# Patient Record
Sex: FEMALE | Race: WHITE | NOT HISPANIC OR LATINO | Employment: FULL TIME | URBAN - METROPOLITAN AREA
[De-identification: names, ages, dates, MRNs, and addresses within clinical notes are randomized per-mention and may not be internally consistent; named-entity substitution may affect disease eponyms.]

---

## 2017-07-22 ENCOUNTER — OFFICE VISIT (OUTPATIENT)
Dept: URGENT CARE | Facility: CLINIC | Age: 22
End: 2017-07-22
Payer: COMMERCIAL

## 2017-07-22 VITALS
OXYGEN SATURATION: 99 % | RESPIRATION RATE: 16 BRPM | BODY MASS INDEX: 21.69 KG/M2 | WEIGHT: 135 LBS | HEIGHT: 66 IN | HEART RATE: 76 BPM | DIASTOLIC BLOOD PRESSURE: 73 MMHG | SYSTOLIC BLOOD PRESSURE: 118 MMHG | TEMPERATURE: 98 F

## 2017-07-22 DIAGNOSIS — H66.92 LEFT OTITIS MEDIA, UNSPECIFIED CHRONICITY, UNSPECIFIED OTITIS MEDIA TYPE: Primary | ICD-10-CM

## 2017-07-22 DIAGNOSIS — H60.339 ACUTE SWIMMER'S EAR, UNSPECIFIED LATERALITY: ICD-10-CM

## 2017-07-22 PROCEDURE — 99213 OFFICE O/P EST LOW 20 MIN: CPT | Mod: 25,S$GLB,, | Performed by: INTERNAL MEDICINE

## 2017-07-22 PROCEDURE — 96372 THER/PROPH/DIAG INJ SC/IM: CPT | Mod: S$GLB,,, | Performed by: INTERNAL MEDICINE

## 2017-07-22 RX ORDER — NORETHINDRONE ACETATE AND ETHINYL ESTRADIOL .02; 1 MG/1; MG/1
1 TABLET ORAL DAILY
COMMUNITY
End: 2021-08-18

## 2017-07-22 RX ORDER — BETAMETHASONE SODIUM PHOSPHATE AND BETAMETHASONE ACETATE 3; 3 MG/ML; MG/ML
9 INJECTION, SUSPENSION INTRA-ARTICULAR; INTRALESIONAL; INTRAMUSCULAR; SOFT TISSUE ONCE
Status: COMPLETED | OUTPATIENT
Start: 2017-07-22 | End: 2017-07-22

## 2017-07-22 RX ORDER — AMOXICILLIN AND CLAVULANATE POTASSIUM 875; 125 MG/1; MG/1
1 TABLET, FILM COATED ORAL EVERY 12 HOURS
Qty: 20 TABLET | Refills: 0 | Status: SHIPPED | OUTPATIENT
Start: 2017-07-22 | End: 2017-08-01

## 2017-07-22 RX ORDER — NEOMYCIN SULFATE, POLYMYXIN B SULFATE, HYDROCORTISONE 3.5; 10000; 1 MG/ML; [USP'U]/ML; MG/ML
4 SOLUTION/ DROPS AURICULAR (OTIC) 3 TIMES DAILY
Qty: 10 ML | Refills: 0 | Status: SHIPPED | OUTPATIENT
Start: 2017-07-22 | End: 2017-08-01

## 2017-07-22 RX ADMIN — BETAMETHASONE SODIUM PHOSPHATE AND BETAMETHASONE ACETATE 9 MG: 3; 3 INJECTION, SUSPENSION INTRA-ARTICULAR; INTRALESIONAL; INTRAMUSCULAR; SOFT TISSUE at 04:07

## 2017-07-22 NOTE — PROGRESS NOTES
"Subjective:       Patient ID: Kyler Ng is a 21 y.o. female.    Vitals:  height is 5' 6" (1.676 m) and weight is 61.2 kg (135 lb). Her tympanic temperature is 98.3 °F (36.8 °C). Her blood pressure is 118/73 and her pulse is 76. Her respiration is 16 and oxygen saturation is 99%.     Chief Complaint: Otalgia    Pt recently started scuba lessons on Friday and thinks that may have started the problem off. Pt also reprorts some neck pain      Otalgia    There is pain in the left ear. This is a new problem. The current episode started yesterday. The problem occurs constantly. The problem has been unchanged. There has been no fever. The pain is at a severity of 10/10. The pain is severe. She has tried acetaminophen for the symptoms. The treatment provided no relief. Her past medical history is significant for a chronic ear infection and a tympanostomy tube.     Review of Systems   Constitution: Negative for chills, fever and malaise/fatigue.   HENT: Positive for ear pain.    Eyes: Negative for discharge and redness.   Cardiovascular: Negative for chest pain, dyspnea on exertion and leg swelling.   Respiratory: Negative for sputum production and wheezing.    Musculoskeletal: Negative for myalgias.   Gastrointestinal: Negative for nausea.       Objective:      Physical Exam   Constitutional: She appears well-developed and well-nourished.   HENT:   Right Ear: There is swelling (,erythema and exudate L canal). Tympanic membrane is injected and erythematous (,with fluid behind drum).   Eyes: Conjunctivae and EOM are normal. Pupils are equal, round, and reactive to light.   Cardiovascular: Normal rate, regular rhythm, normal heart sounds and intact distal pulses.    Pulmonary/Chest: Effort normal and breath sounds normal.   Skin: Skin is warm and dry.       Assessment:       1. Left otitis media, unspecified chronicity, unspecified otitis media type    2. Acute swimmer's ear, unspecified laterality        Plan:         Left " otitis media, unspecified chronicity, unspecified otitis media type  -     betamethasone acetate-betamethasone sodium phosphate injection 9 mg; Inject 1.5 mLs (9 mg total) into the muscle once.  -     amoxicillin-clavulanate 875-125mg (AUGMENTIN) 875-125 mg per tablet; Take 1 tablet by mouth every 12 (twelve) hours.  Dispense: 20 tablet; Refill: 0    Acute swimmer's ear, unspecified laterality  -     neomycin-polymyxin-hydrocortisone (CORTISPORIN) otic solution; Place 4 drops into the left ear 3 (three) times daily.  Dispense: 10 mL; Refill: 0

## 2018-11-08 ENCOUNTER — OFFICE VISIT (OUTPATIENT)
Dept: URGENT CARE | Facility: CLINIC | Age: 23
End: 2018-11-08
Payer: COMMERCIAL

## 2018-11-08 VITALS
OXYGEN SATURATION: 98 % | SYSTOLIC BLOOD PRESSURE: 120 MMHG | TEMPERATURE: 98 F | WEIGHT: 135 LBS | HEART RATE: 85 BPM | DIASTOLIC BLOOD PRESSURE: 81 MMHG | HEIGHT: 66 IN | RESPIRATION RATE: 16 BRPM | BODY MASS INDEX: 21.69 KG/M2

## 2018-11-08 DIAGNOSIS — R30.0 DYSURIA: Primary | ICD-10-CM

## 2018-11-08 DIAGNOSIS — B00.9 HERPES: ICD-10-CM

## 2018-11-08 LAB
B-HCG UR QL: NEGATIVE
BILIRUB UR QL STRIP: NEGATIVE
CTP QC/QA: YES
GLUCOSE UR QL STRIP: NEGATIVE
KETONES UR QL STRIP: NEGATIVE
LEUKOCYTE ESTERASE UR QL STRIP: NEGATIVE
PH, POC UA: 6 (ref 5–8)
POC BLOOD, URINE: NEGATIVE
POC NITRATES, URINE: NEGATIVE
PROT UR QL STRIP: NEGATIVE
SP GR UR STRIP: 1.01 (ref 1–1.03)
UROBILINOGEN UR STRIP-ACNC: NORMAL (ref 0.1–1.1)

## 2018-11-08 PROCEDURE — 81025 URINE PREGNANCY TEST: CPT | Mod: S$GLB,,, | Performed by: NURSE PRACTITIONER

## 2018-11-08 PROCEDURE — 87086 URINE CULTURE/COLONY COUNT: CPT

## 2018-11-08 PROCEDURE — 99214 OFFICE O/P EST MOD 30 MIN: CPT | Mod: 25,S$GLB,, | Performed by: NURSE PRACTITIONER

## 2018-11-08 PROCEDURE — 81003 URINALYSIS AUTO W/O SCOPE: CPT | Mod: QW,S$GLB,, | Performed by: NURSE PRACTITIONER

## 2018-11-08 PROCEDURE — 3008F BODY MASS INDEX DOCD: CPT | Mod: CPTII,S$GLB,, | Performed by: NURSE PRACTITIONER

## 2018-11-08 PROCEDURE — 87660 TRICHOMONAS VAGIN DIR PROBE: CPT

## 2018-11-08 RX ORDER — VALACYCLOVIR HYDROCHLORIDE 500 MG/1
500 TABLET, FILM COATED ORAL 2 TIMES DAILY
Qty: 6 TABLET | Refills: 1 | Status: ON HOLD | OUTPATIENT
Start: 2018-11-08 | End: 2022-07-22 | Stop reason: HOSPADM

## 2018-11-08 NOTE — PATIENT INSTRUCTIONS
We will call with urine culture and swab results  Valtrex if needed for outbreaks as discussed     UTI Relief   · Increase water intake, decrease sodas, tea, coffee, energy drinks. Cranberry products are thought to protect against UTI by inhibiting bacterial growth and adhesion to the bladder.   · Tylenol (acetaminophen) and/or NSAIDS (motrin, ibuprofen)   · Timed voiding every 2-3 hours ( void every 2-3 hours even if you do not feel the urge)  · OTC AZO/pyridium if symptoms are persistent - this will turn your urine orange.  · Sitting in the tub of warm water 20-30 minutes 3-4x/day can help with symptom relief   · Avoid tight fitting cloths, douching, tampon use  · Wipe front to back   · Void prior to and after intercourse   · Use probiotics especially with any antibiotic therapy

## 2018-11-08 NOTE — PROGRESS NOTES
"Subjective:       Patient ID: Kyler Ng is a 23 y.o. female.    Vitals:  height is 5' 6" (1.676 m) and weight is 61.2 kg (135 lb). Her temperature is 98.4 °F (36.9 °C). Her blood pressure is 120/81 and her pulse is 85. Her respiration is 16 and oxygen saturation is 98%.     Chief Complaint: Dysuria      Cloudy urine this morning, frequency for 2 days, Denies dysuria. States it just "feels different" down there  Denies discharge or itching.   2 months ago had herpes outbreak   Drink coffee 1 cup a day, water the rest  States she was checked recently and does not believe she has std         Dysuria    This is a new problem. The current episode started yesterday. The problem has been gradually worsening. There has been no fever. She is sexually active. There is no history of pyelonephritis. Associated symptoms include frequency and urgency. Pertinent negatives include no chills, flank pain, hematuria, nausea, vomiting or rash. She has tried nothing for the symptoms.     Review of Systems   Constitution: Negative for chills and fever.   Skin: Negative for itching and rash.   Musculoskeletal: Negative for back pain.   Gastrointestinal: Negative for abdominal pain, nausea and vomiting.   Genitourinary: Positive for dysuria, frequency and urgency. Negative for flank pain, genital sores, hematuria, missed menses and non-menstrual bleeding.       Objective:      Physical Exam   Constitutional: She is oriented to person, place, and time. She appears well-developed and well-nourished.   HENT:   Head: Normocephalic and atraumatic.   Right Ear: External ear normal.   Left Ear: External ear normal.   Nose: Nose normal.   Eyes: Lids are normal.   Neck: Trachea normal, normal range of motion and phonation normal. Neck supple.   Cardiovascular: Normal pulses.   Pulmonary/Chest: Effort normal.   Abdominal: Soft. Normal appearance and bowel sounds are normal. She exhibits no distension. There is no tenderness. There is no CVA " tenderness.   Neurological: She is alert and oriented to person, place, and time.   Skin: Skin is warm, dry and intact.   Psychiatric: She has a normal mood and affect. Her speech is normal and behavior is normal. Cognition and memory are normal.   Nursing note and vitals reviewed.      Assessment:       1. Dysuria    2. Herpes        Plan:         Dysuria  -     POCT Urinalysis, Dipstick, Automated, W/O Scope  -     POCT urine pregnancy  -     Urine culture  -     Vaginosis Screen by DNA Probe    Herpes    Other orders  -     valACYclovir (VALTREX) 500 MG tablet; Take 1 tablet (500 mg total) by mouth 2 (two) times daily. for 3 days  Dispense: 6 tablet; Refill: 1      Patient Instructions   We will call with urine culture and swab results  Valtrex if needed for outbreaks as discussed     UTI Relief   · Increase water intake, decrease sodas, tea, coffee, energy drinks. Cranberry products are thought to protect against UTI by inhibiting bacterial growth and adhesion to the bladder.   · Tylenol (acetaminophen) and/or NSAIDS (motrin, ibuprofen)   · Timed voiding every 2-3 hours ( void every 2-3 hours even if you do not feel the urge)  · OTC AZO/pyridium if symptoms are persistent - this will turn your urine orange.  · Sitting in the tub of warm water 20-30 minutes 3-4x/day can help with symptom relief   · Avoid tight fitting cloths, douching, tampon use  · Wipe front to back   · Void prior to and after intercourse   · Use probiotics especially with any antibiotic therapy

## 2018-11-09 ENCOUNTER — TELEPHONE (OUTPATIENT)
Dept: URGENT CARE | Facility: CLINIC | Age: 23
End: 2018-11-09

## 2018-11-09 LAB
CANDIDA RRNA VAG QL PROBE: NEGATIVE
G VAGINALIS RRNA GENITAL QL PROBE: POSITIVE
T VAGINALIS RRNA GENITAL QL PROBE: NEGATIVE

## 2018-11-09 RX ORDER — METRONIDAZOLE 500 MG/1
500 TABLET ORAL EVERY 12 HOURS
Qty: 14 TABLET | Refills: 0 | Status: SHIPPED | OUTPATIENT
Start: 2018-11-09 | End: 2018-11-16

## 2018-11-09 NOTE — TELEPHONE ENCOUNTER
Called to tell patient BV positive and flagyl sen into pharmacy. Left message for patient to call back will recommend to add probiotics

## 2018-11-10 LAB — BACTERIA UR CULT: NORMAL

## 2018-11-11 ENCOUNTER — TELEPHONE (OUTPATIENT)
Dept: URGENT CARE | Facility: CLINIC | Age: 23
End: 2018-11-11

## 2018-11-11 NOTE — TELEPHONE ENCOUNTER
Urine culture negative. Vaginosis screen was positive for BV and patient was started on flagyl appropriately. Left v/m for patient to call back to review results.

## 2018-11-11 NOTE — TELEPHONE ENCOUNTER
Reviewed results of vaginosis screen with patient. She states she is feeling better on flagyl. advised to continue antibiotic until completion.

## 2020-02-05 NOTE — PROGRESS NOTES
New Patient     SUBJECTIVE:  Patient ID: Kyler Ng   MRN: [id number]  Referred By: Dr. Olinda Wells  Chief Complaint: Headache      History of Present Illness:   24 y.o. female with a PMHx of OCD, depression, endometriosis, who presents to clinic with boyfriend for evaluation of headaches.   PMHx negative for TBI, Meningitis, Aneurysms, Kidney Stones, asthma, GI bleed, osteoporosis, CAD/MI, CVA/TIA, DM, cancer, pregnancy   Family Hx negative for Migraines.     Headache History:  Onset - 6 mo  Previous Hx of HA - yes, triggered by lack of sleep/caffeine, relieved by sleep and ibuprofen  Location/Radiation - unilateral temples, behind ears  Quality - pressure  Duration - 3 hours - 8 hours  Intensity (range) - 2-10/10  Frequency - 15/30 ha days per month x 3 mo, 2/30 are debilitating  Triggers - alcohol, weather changes  Aggravating Factors - sunlight, loud noises, smells  Alleviating Factors - ice pack, dark room, sleep  Recent Changes - switched birth control to a higher dose of estrogen (however HA's have never been related to her menstrual cycles)  Prodrome/Aura - prior to HA experiences nausea, lightheaded, dizziness  HA today? - 1/10  Time of day of most headaches- lunch time (drinks coffee daily, skips breakfast)  Sleep - snores, doesn't always wake feeling refreshed, resolution of headache with sleep, doesn't wake w/ HA, sleeps well, wakes through night    Associated symptoms with the headache: nausea, lightheaded, dizziness, photo/phonophobia, hyperosmia    Treatments Tried   Ibuprofen, excedrin, tylenol - 3d/week    Social History  Alcohol - socially  Smoke - denies  Recreational Drug Use- denies    Current Medications:    Current Outpatient Medications:     norethindrone-ethinyl estradiol (MICROGESTIN 1/20) 1-20 mg-mcg per tablet, Take 1 tablet by mouth once daily., Disp: , Rfl:     amitriptyline (ELAVIL) 25 MG tablet, Take 1 tablet (25 mg total) by mouth every evening., Disp: 30 tablet, Rfl: 3    naproxen  "(NAPROSYN) 250 MG tablet, Take 1 tablet (250 mg total) by mouth 2 (two) times daily as needed (for headache)., Disp: 20 tablet, Rfl: 3    ondansetron (ZOFRAN-ODT) 4 MG TbDL, Take 1 tablet (4 mg total) by mouth every 6 (six) hours as needed., Disp: 20 tablet, Rfl: 3    rizatriptan (MAXALT-MLT) 5 MG disintegrating tablet, Take at onset of migraine, can repeat in 2 hrs if needed.  No more than 2 tabs per day or 3 days/wk., Disp: 12 tablet, Rfl: 3    valACYclovir (VALTREX) 500 MG tablet, Take 1 tablet (500 mg total) by mouth 2 (two) times daily. for 3 days, Disp: 6 tablet, Rfl: 1    Review of Systems - as per HPI, otherwise a balanced 10 systems review is negative.    OBJECTIVE:  Vitals:  /71   Pulse 91   Ht 5' 6" (1.676 m)   Wt 69.4 kg (153 lb)   BMI 24.69 kg/m²     Physical Exam   Constitutional: she appears well-developed and well-nourished. she is well groomed. NAD  HENT:    Head: Normocephalic and atraumatic, oral and nasal mucosa intact.  Frontalis was TTP, temporalis was TTP   Eyes: Conjunctivae and EOM are normal. Pupils are equal, round, and reactive to light   Neck: Neck supple. Occiput and trapezius TTP, traps, + tinnel   Musculoskeletal: Normal range of motion. No joint stiffness. No vertebral point tenderness.  Skin: Skin is warm and dry.  Psychiatric: Normal mood and affect.     Neuro exam:    Mental status:  The patient is alert and oriented to person, place and time.  Language is intact and fluent  Remote and recent memory are intact  Normal attention and concentration  Mood is stable    Cranial Nerves:  Fundoscopic examination does not reveal any occult papilledema.    Pupils are equal and reactive to light.    Extraocular movements are intact and without nystagmus.    Visual fields are full to confrontation testing.   Facial movement is symmetric.  Facial sensation is intact.    Hearing is intact to finger rub   Uvula in midline. Tongue in midline without fasciculation.   FROM of neck in " all (6) directions without pain  Shoulder shrug symmetrical.    Coordination:     Finger to nose - normal and symmetric bilaterally   Heel to shin test - normal and symmetric bilaterally     Motor:  Normal muscle bulk and symmetry. No fasciculations were noted.   Tremor not apparent   Pronator drift not apparent.    strength was strong and symmetric  Finger extension strength was strong and symmetric  RUE:appropriate against gravity and medium force as tested 5/5  LUE: appropriate against gravity and medium force as tested 5/5  RLE:appropriate against gravity and medium force as tested 5/5              LLE: appropriate against gravity and medium force as tested 5/5    Reflexes:  Right Brachioradialis 2+  Left Brachioradialis 2+  Right Biceps 2+  Left Biceps 2+  Right Patellar2+  Left Patellar 2+  Right Achilles 2+  Left Achilles 2+                          Godoy was negative    Sensory:  RUE  intact light touch  LUE intact light touch  RLE intact light touch  LLE intact light touch    Gait:   Romberg - negative  Normal gait  Tandem, Heel, and Toe Walk - able to perform without difficulty    Review of Data:   Notes from urgent care reviewed   Labs:  No visits with results within 3 Month(s) from this visit.   Latest known visit with results is:   Office Visit on 11/08/2018   Component Date Value Ref Range Status    POC Blood, Urine 11/08/2018 Negative  Negative Final    POC Bilirubin, Urine 11/08/2018 Negative  Negative Final    POC Urobilinogen, Urine 11/08/2018 Normal  0.1 - 1.1 Final    POC Ketones, Urine 11/08/2018 Negative  Negative Final    POC Protein, Urine 11/08/2018 Negative  Negative Final    POC Nitrates, Urine 11/08/2018 Negative  Negative Final    POC Glucose, Urine 11/08/2018 Negative  Negative Final    pH, UA 11/08/2018 6.0  5 - 8 Final    POC Specific Gravity, Urine 11/08/2018 1.015  1.003 - 1.029 Final    POC Leukocytes, Urine 11/08/2018 Negative  Negative Final    POC Preg Test, Ur  11/08/2018 Negative  Negative Final     Acceptable 11/08/2018 Yes   Final    Urine Culture, Routine 11/08/2018 No significant growth   Final    Trichomonas vaginalis 11/08/2018 Negative  Negative Final    Gardnerella vaginalis 11/08/2018 Positive* Negative Final    Candida sp 11/08/2018 Negative  Negative Final     Imaging:  No results found for this or any previous visit.  Note: I have independently reviewed any/all imaging/labs/tests and agree with the report (s) as documented.  Any discrepancies will be as noted/demarcated by free text.  KATI LALA 2/6/2020    ASSESSMENT:  1. Migraine without aura and without status migrainosus, not intractable    2. Chronic migraine          PLAN:  - Discussed symptoms appear to be consistent with migraines, discussed treatment options and patient agreed with the following plan:  - ppx - elavil  - abortive - maxalt and naproxen  - nausea - zofran  - consider d/c caffeine, eating breakfast, balanced lunch, watching for triggers  - consider NB in the future  - avoid bb - hx of depression   - risks, benefits, and potential side effects of elavil, maxalt, naproxen, zofran discussed   - alternative treatment options offered   - importance of healthy diet, regular exercise and sleep hygiene in the treatment of headaches    - Start tracking headaches via Migraine Seng joel on phone   - RTC in 6 wks     Orders Placed This Encounter    ondansetron (ZOFRAN-ODT) 4 MG TbDL    amitriptyline (ELAVIL) 25 MG tablet    rizatriptan (MAXALT-MLT) 5 MG disintegrating tablet    naproxen (NAPROSYN) 250 MG tablet       I have discussed realistic goals of care with patient at length as well as medication options, and need for lifestyle adjustment. I have explained that treatment will take time. We have agreed that the goal will be to reduce frequency/intensity/quantity of HA, not to be completely HA free. I have explained my non narcotic policy regarding headache treatment.     Patient agreeable to work on lifestyle adjustments.    Questions and concerns were sought and answered to the patient's stated verbal satisfaction.  The patient verbalizes understanding and agreement with the above stated treatment plan.     CC: DO Kristine Dalton PA-C  [facility]  [phone]    Dr. Erazo was available during today's encounter.

## 2020-02-06 ENCOUNTER — OFFICE VISIT (OUTPATIENT)
Dept: NEUROLOGY | Facility: CLINIC | Age: 25
End: 2020-02-06
Payer: COMMERCIAL

## 2020-02-06 VITALS
DIASTOLIC BLOOD PRESSURE: 71 MMHG | BODY MASS INDEX: 24.59 KG/M2 | HEIGHT: 66 IN | WEIGHT: 153 LBS | HEART RATE: 91 BPM | SYSTOLIC BLOOD PRESSURE: 113 MMHG

## 2020-02-06 DIAGNOSIS — G43.009 MIGRAINE WITHOUT AURA AND WITHOUT STATUS MIGRAINOSUS, NOT INTRACTABLE: Primary | ICD-10-CM

## 2020-02-06 PROCEDURE — 3008F PR BODY MASS INDEX (BMI) DOCUMENTED: ICD-10-PCS | Mod: CPTII,S$GLB,, | Performed by: PHYSICIAN ASSISTANT

## 2020-02-06 PROCEDURE — 99204 PR OFFICE/OUTPT VISIT, NEW, LEVL IV, 45-59 MIN: ICD-10-PCS | Mod: S$GLB,,, | Performed by: PHYSICIAN ASSISTANT

## 2020-02-06 PROCEDURE — 99999 PR PBB SHADOW E&M-EST. PATIENT-LVL III: ICD-10-PCS | Mod: PBBFAC,,, | Performed by: PHYSICIAN ASSISTANT

## 2020-02-06 PROCEDURE — 3008F BODY MASS INDEX DOCD: CPT | Mod: CPTII,S$GLB,, | Performed by: PHYSICIAN ASSISTANT

## 2020-02-06 PROCEDURE — 99999 PR PBB SHADOW E&M-EST. PATIENT-LVL III: CPT | Mod: PBBFAC,,, | Performed by: PHYSICIAN ASSISTANT

## 2020-02-06 PROCEDURE — 99204 OFFICE O/P NEW MOD 45 MIN: CPT | Mod: S$GLB,,, | Performed by: PHYSICIAN ASSISTANT

## 2020-02-06 RX ORDER — AMITRIPTYLINE HYDROCHLORIDE 25 MG/1
25 TABLET, FILM COATED ORAL NIGHTLY
Qty: 30 TABLET | Refills: 3 | Status: SHIPPED | OUTPATIENT
Start: 2020-02-06 | End: 2020-07-14 | Stop reason: SDUPTHER

## 2020-02-06 RX ORDER — ONDANSETRON 4 MG/1
4 TABLET, ORALLY DISINTEGRATING ORAL EVERY 6 HOURS PRN
Qty: 20 TABLET | Refills: 3 | Status: ON HOLD | OUTPATIENT
Start: 2020-02-06 | End: 2022-07-22 | Stop reason: HOSPADM

## 2020-02-06 RX ORDER — RIZATRIPTAN BENZOATE 5 MG/1
TABLET, ORALLY DISINTEGRATING ORAL
Qty: 12 TABLET | Refills: 3 | Status: SHIPPED | OUTPATIENT
Start: 2020-02-06 | End: 2020-07-17

## 2020-02-06 RX ORDER — NAPROXEN 250 MG/1
250 TABLET ORAL 2 TIMES DAILY PRN
Qty: 20 TABLET | Refills: 3 | Status: SHIPPED | OUTPATIENT
Start: 2020-02-06 | End: 2020-07-17

## 2020-02-06 NOTE — PATIENT INSTRUCTIONS
Supplements for Migraine:  1. Magnesium Oxide - 400mg by mouth daily  2. Riboflavin (Vitamin B2) - 400mg by mouth daily  3. Coenzyme Q10 - 200mg tablet by mouth daily    - Please download Migraine Seng joel on phone and begin tracking your headaches

## 2020-02-06 NOTE — LETTER
February 6, 2020      Olinda Wells DO  [address] [facility] [address]           [facility]  [address]  [facility] [address]  Phone: [phone]  Fax: [phone]          Patient: Kyler Ng   MR Number: [id number]   YOB: 1995   Date of Visit: 2/6/2020       Dear Dr. Olinda Wells:    Thank you for referring Kyler Ng to me for evaluation. Attached you will find relevant portions of my assessment and plan of care.    If you have questions, please do not hesitate to call me. I look forward to following Kyler Ng along with you.    Sincerely,    Kristine Hunt PA-C    Enclosure  CC:  No Recipients    If you would like to receive this communication electronically, please contact [email or web address] or [phone] to request more information on Chance (app) Link access.    For providers and/or their staff who would like to refer a patient to [facility], please contact us through our one-stop-shop provider referral line, [facility], at [phone].    If you feel you have received this communication in error or would no longer like to receive these types of communications, please e-mail [email or web address]

## 2020-07-17 ENCOUNTER — OFFICE VISIT (OUTPATIENT)
Dept: NEUROLOGY | Facility: CLINIC | Age: 25
End: 2020-07-17
Payer: COMMERCIAL

## 2020-07-17 VITALS
DIASTOLIC BLOOD PRESSURE: 76 MMHG | HEART RATE: 99 BPM | SYSTOLIC BLOOD PRESSURE: 116 MMHG | WEIGHT: 154.44 LBS | HEIGHT: 66 IN | BODY MASS INDEX: 24.82 KG/M2

## 2020-07-17 DIAGNOSIS — G43.009 MIGRAINE WITHOUT AURA AND WITHOUT STATUS MIGRAINOSUS, NOT INTRACTABLE: ICD-10-CM

## 2020-07-17 PROCEDURE — 99999 PR PBB SHADOW E&M-EST. PATIENT-LVL III: ICD-10-PCS | Mod: PBBFAC,,, | Performed by: PHYSICIAN ASSISTANT

## 2020-07-17 PROCEDURE — 99213 PR OFFICE/OUTPT VISIT, EST, LEVL III, 20-29 MIN: ICD-10-PCS | Mod: S$GLB,,, | Performed by: PHYSICIAN ASSISTANT

## 2020-07-17 PROCEDURE — 3008F PR BODY MASS INDEX (BMI) DOCUMENTED: ICD-10-PCS | Mod: CPTII,S$GLB,, | Performed by: PHYSICIAN ASSISTANT

## 2020-07-17 PROCEDURE — 3008F BODY MASS INDEX DOCD: CPT | Mod: CPTII,S$GLB,, | Performed by: PHYSICIAN ASSISTANT

## 2020-07-17 PROCEDURE — 99213 OFFICE O/P EST LOW 20 MIN: CPT | Mod: S$GLB,,, | Performed by: PHYSICIAN ASSISTANT

## 2020-07-17 PROCEDURE — 99999 PR PBB SHADOW E&M-EST. PATIENT-LVL III: CPT | Mod: PBBFAC,,, | Performed by: PHYSICIAN ASSISTANT

## 2020-07-17 RX ORDER — AMITRIPTYLINE HYDROCHLORIDE 25 MG/1
25 TABLET, FILM COATED ORAL NIGHTLY
Qty: 30 TABLET | Refills: 11 | Status: SHIPPED | OUTPATIENT
Start: 2020-07-17 | End: 2021-07-26 | Stop reason: SDUPTHER

## 2020-07-17 RX ORDER — NAPROXEN 250 MG/1
250 TABLET ORAL 2 TIMES DAILY PRN
Qty: 20 TABLET | Refills: 11 | Status: ON HOLD | OUTPATIENT
Start: 2020-07-17 | End: 2022-07-22 | Stop reason: HOSPADM

## 2020-07-17 NOTE — PROGRESS NOTES
Established Patient   SUBJECTIVE:  Patient ID: Kyler Ng   Chief Complaint: Headache    History of Present Illness:  Kyler Ng is a 24 y.o. female with a PMHx of OCD, depression, endometriosis who presents to clinic alone for follow-up of headaches.       Recommendations made at last Office Visit on 2/6/20:  - Discussed symptoms appear to be consistent with migraines, discussed treatment options and patient agreed with the following plan:  - ppx - elavil  - abortive - maxalt and naproxen  - nausea - zofran  - consider d/c caffeine, eating breakfast, balanced lunch, watching for triggers  - consider NB in the future  - avoid bb - hx of depression   - risks, benefits, and potential side effects of elavil, maxalt, naproxen, zofran discussed   - alternative treatment options offered   - importance of healthy diet, regular exercise and sleep hygiene in the treatment of headaches    - Start tracking headaches via Migraine Seng joel on phone   - RTC in 6 wks     07/17/2020 - Interval History:  Last visit for pt's migraines we started elavil, maxalt, naproxen, and zofran.   She reports her Ha's have improved greatly and are occurring 4/30 days per month now. They are mild in severity and recalls her last severe HA was in March. They only last a few hours at a time and are easily relieved by naproxen. She finds they commonly occur around her menstrual cycle or weather changes. Also feels zofran helps greatly. She has not tried maxalt.   Plan: good control of her HA's, will continue current regimen including elavil, naproxen, and zofran and f/u yearly to discuss d/c of elavil    Treatments Tried:  Elavil - helps  Naproxen - helps  Ibuprofen, excedrin, tylenol - 3d/week  zofran - helps    Current Medications:    Current Outpatient Medications:     amitriptyline (ELAVIL) 25 MG tablet, Take 1 tablet (25 mg total) by mouth every evening., Disp: 30 tablet, Rfl: 11    naproxen (NAPROSYN) 250 MG tablet, Take 1 tablet (250 mg  "total) by mouth 2 (two) times daily as needed (for headache)., Disp: 20 tablet, Rfl: 11    norethindrone-ethinyl estradiol (MICROGESTIN 1/20) 1-20 mg-mcg per tablet, Take 1 tablet by mouth once daily., Disp: , Rfl:     ondansetron (ZOFRAN-ODT) 4 MG TbDL, Take 1 tablet (4 mg total) by mouth every 6 (six) hours as needed., Disp: 20 tablet, Rfl: 3    valACYclovir (VALTREX) 500 MG tablet, Take 1 tablet (500 mg total) by mouth 2 (two) times daily. for 3 days, Disp: 6 tablet, Rfl: 1    Review of Systems - as per HPI, otherwise a balanced 10 systems review is negative.    OBJECTIVE:  Vitals:  /76   Pulse 99   Ht 5' 6" (1.676 m)   Wt 70.1 kg (154 lb 6.9 oz)   BMI 24.93 kg/m²      Physical Exam:  Constitutional: she appears well-developed and well-nourished. she is well groomed. NAD   HENT:    Head: Normocephalic and atraumatic  Eyes: Conjunctivae and EOM are normal  Musculoskeletal: Normal range of motion. No joint stiffness.   Skin: Skin is warm and dry.  Psychiatric: Mood and affect are normal    Neuro: Patient is alert and oriented to person, place, and time. Language is intact and fluent. Speech is clear and fluent. Recent and remote memory are intact.  Normal attention and concentration.  Facial movement is symmetric. Moves all 4 extremities against gravity. Gait and station normal.  Cranial Nerves II through XII without focal deficit.     Review of Data:   Notes from neuro reviewed   Labs:  No visits with results within 3 Month(s) from this visit.   Latest known visit with results is:   Office Visit on 11/08/2018   Component Date Value Ref Range Status    POC Blood, Urine 11/08/2018 Negative  Negative Final    POC Bilirubin, Urine 11/08/2018 Negative  Negative Final    POC Urobilinogen, Urine 11/08/2018 Normal  0.1 - 1.1 Final    POC Ketones, Urine 11/08/2018 Negative  Negative Final    POC Protein, Urine 11/08/2018 Negative  Negative Final    POC Nitrates, Urine 11/08/2018 Negative  Negative Final "    POC Glucose, Urine 11/08/2018 Negative  Negative Final    pH, UA 11/08/2018 6.0  5 - 8 Final    POC Specific Gravity, Urine 11/08/2018 1.015  1.003 - 1.029 Final    POC Leukocytes, Urine 11/08/2018 Negative  Negative Final    POC Preg Test, Ur 11/08/2018 Negative  Negative Final     Acceptable 11/08/2018 Yes   Final    Urine Culture, Routine 11/08/2018 No significant growth   Final    Trichomonas vaginalis 11/08/2018 Negative  Negative Final    Gardnerella vaginalis 11/08/2018 Positive* Negative Final    Candida sp 11/08/2018 Negative  Negative Final     Imaging:  No results found for this or any previous visit.  Note: I have independently reviewed any/all imaging/labs/tests and agree with the report (s) as documented.  Any discrepancies will be as noted/demarcated by free text.  KATI LALA 7/17/2020    ASSESSMENT:  1. Migraine without aura and without status migrainosus, not intractable        PLAN:  - Discussed symptoms appear to be consistent with migraines, discussed treatment options and patient agreed with the following plan:  - good control of HA's  - ppx - continue elavil  - abortive - continue naproxen  - nausea - continue zofran  - avoid bb - hx of depression   - Continue tracking headaches   - Discussed goals of therapy are to decrease the frequency, intensity, and duration of headaches  - RTC yearly     Orders Placed This Encounter    amitriptyline (ELAVIL) 25 MG tablet    naproxen (NAPROSYN) 250 MG tablet       Questions and concerns were sought and answered to the patient's stated verbal satisfaction.  The patient verbalizes understanding and agreement with the above stated treatment plan.     CC: DO Kristine Dalton PA-C  [facility]   [phone]    Dr. Erazo was available during today's encounter.

## 2021-04-16 ENCOUNTER — PATIENT MESSAGE (OUTPATIENT)
Dept: RESEARCH | Facility: HOSPITAL | Age: 26
End: 2021-04-16

## 2021-07-26 DIAGNOSIS — G43.009 MIGRAINE WITHOUT AURA AND WITHOUT STATUS MIGRAINOSUS, NOT INTRACTABLE: ICD-10-CM

## 2021-07-26 RX ORDER — AMITRIPTYLINE HYDROCHLORIDE 25 MG/1
25 TABLET, FILM COATED ORAL NIGHTLY
Qty: 30 TABLET | Refills: 0 | Status: SHIPPED | OUTPATIENT
Start: 2021-07-26 | End: 2021-08-18

## 2021-08-18 ENCOUNTER — OFFICE VISIT (OUTPATIENT)
Dept: NEUROLOGY | Facility: CLINIC | Age: 26
End: 2021-08-18
Payer: COMMERCIAL

## 2021-08-18 VITALS
HEIGHT: 66 IN | BODY MASS INDEX: 24.93 KG/M2 | DIASTOLIC BLOOD PRESSURE: 72 MMHG | HEART RATE: 94 BPM | SYSTOLIC BLOOD PRESSURE: 108 MMHG

## 2021-08-18 DIAGNOSIS — G43.009 MIGRAINE WITHOUT AURA AND WITHOUT STATUS MIGRAINOSUS, NOT INTRACTABLE: Primary | ICD-10-CM

## 2021-08-18 PROCEDURE — 1159F MED LIST DOCD IN RCRD: CPT | Mod: CPTII,S$GLB,, | Performed by: PHYSICIAN ASSISTANT

## 2021-08-18 PROCEDURE — 1159F PR MEDICATION LIST DOCUMENTED IN MEDICAL RECORD: ICD-10-PCS | Mod: CPTII,S$GLB,, | Performed by: PHYSICIAN ASSISTANT

## 2021-08-18 PROCEDURE — 1126F PR PAIN SEVERITY QUANTIFIED, NO PAIN PRESENT: ICD-10-PCS | Mod: CPTII,S$GLB,, | Performed by: PHYSICIAN ASSISTANT

## 2021-08-18 PROCEDURE — 99214 PR OFFICE/OUTPT VISIT, EST, LEVL IV, 30-39 MIN: ICD-10-PCS | Mod: S$GLB,,, | Performed by: PHYSICIAN ASSISTANT

## 2021-08-18 PROCEDURE — 1160F RVW MEDS BY RX/DR IN RCRD: CPT | Mod: CPTII,S$GLB,, | Performed by: PHYSICIAN ASSISTANT

## 2021-08-18 PROCEDURE — 1126F AMNT PAIN NOTED NONE PRSNT: CPT | Mod: CPTII,S$GLB,, | Performed by: PHYSICIAN ASSISTANT

## 2021-08-18 PROCEDURE — 99999 PR PBB SHADOW E&M-EST. PATIENT-LVL III: ICD-10-PCS | Mod: PBBFAC,,, | Performed by: PHYSICIAN ASSISTANT

## 2021-08-18 PROCEDURE — 3008F PR BODY MASS INDEX (BMI) DOCUMENTED: ICD-10-PCS | Mod: CPTII,S$GLB,, | Performed by: PHYSICIAN ASSISTANT

## 2021-08-18 PROCEDURE — 1160F PR REVIEW ALL MEDS BY PRESCRIBER/CLIN PHARMACIST DOCUMENTED: ICD-10-PCS | Mod: CPTII,S$GLB,, | Performed by: PHYSICIAN ASSISTANT

## 2021-08-18 PROCEDURE — 3074F SYST BP LT 130 MM HG: CPT | Mod: CPTII,S$GLB,, | Performed by: PHYSICIAN ASSISTANT

## 2021-08-18 PROCEDURE — 99214 OFFICE O/P EST MOD 30 MIN: CPT | Mod: S$GLB,,, | Performed by: PHYSICIAN ASSISTANT

## 2021-08-18 PROCEDURE — 3078F DIAST BP <80 MM HG: CPT | Mod: CPTII,S$GLB,, | Performed by: PHYSICIAN ASSISTANT

## 2021-08-18 PROCEDURE — 3008F BODY MASS INDEX DOCD: CPT | Mod: CPTII,S$GLB,, | Performed by: PHYSICIAN ASSISTANT

## 2021-08-18 PROCEDURE — 3074F PR MOST RECENT SYSTOLIC BLOOD PRESSURE < 130 MM HG: ICD-10-PCS | Mod: CPTII,S$GLB,, | Performed by: PHYSICIAN ASSISTANT

## 2021-08-18 PROCEDURE — 99999 PR PBB SHADOW E&M-EST. PATIENT-LVL III: CPT | Mod: PBBFAC,,, | Performed by: PHYSICIAN ASSISTANT

## 2021-08-18 PROCEDURE — 3078F PR MOST RECENT DIASTOLIC BLOOD PRESSURE < 80 MM HG: ICD-10-PCS | Mod: CPTII,S$GLB,, | Performed by: PHYSICIAN ASSISTANT

## 2021-09-03 ENCOUNTER — PATIENT MESSAGE (OUTPATIENT)
Dept: NEUROLOGY | Facility: CLINIC | Age: 26
End: 2021-09-03

## 2022-07-22 PROBLEM — Z34.90 ENCOUNTER FOR ELECTIVE INDUCTION OF LABOR: Status: ACTIVE | Noted: 2022-07-22
